# Patient Record
Sex: FEMALE | Race: BLACK OR AFRICAN AMERICAN | NOT HISPANIC OR LATINO | URBAN - METROPOLITAN AREA
[De-identification: names, ages, dates, MRNs, and addresses within clinical notes are randomized per-mention and may not be internally consistent; named-entity substitution may affect disease eponyms.]

---

## 2022-12-24 ENCOUNTER — EMERGENCY (EMERGENCY)
Facility: HOSPITAL | Age: 62
LOS: 0 days | Discharge: HOME | End: 2022-12-24
Attending: STUDENT IN AN ORGANIZED HEALTH CARE EDUCATION/TRAINING PROGRAM
Payer: MEDICAID

## 2022-12-24 VITALS
SYSTOLIC BLOOD PRESSURE: 143 MMHG | DIASTOLIC BLOOD PRESSURE: 83 MMHG | OXYGEN SATURATION: 99 % | RESPIRATION RATE: 16 BRPM | TEMPERATURE: 98 F | HEART RATE: 62 BPM

## 2022-12-24 VITALS
HEIGHT: 63 IN | HEART RATE: 67 BPM | SYSTOLIC BLOOD PRESSURE: 170 MMHG | TEMPERATURE: 98 F | DIASTOLIC BLOOD PRESSURE: 78 MMHG | WEIGHT: 179.9 LBS | RESPIRATION RATE: 17 BRPM | OXYGEN SATURATION: 100 %

## 2022-12-24 DIAGNOSIS — S00.93XA CONTUSION OF UNSPECIFIED PART OF HEAD, INITIAL ENCOUNTER: ICD-10-CM

## 2022-12-24 DIAGNOSIS — Z79.84 LONG TERM (CURRENT) USE OF ORAL HYPOGLYCEMIC DRUGS: ICD-10-CM

## 2022-12-24 DIAGNOSIS — Z79.82 LONG TERM (CURRENT) USE OF ASPIRIN: ICD-10-CM

## 2022-12-24 DIAGNOSIS — M54.2 CERVICALGIA: ICD-10-CM

## 2022-12-24 DIAGNOSIS — S16.1XXA STRAIN OF MUSCLE, FASCIA AND TENDON AT NECK LEVEL, INITIAL ENCOUNTER: ICD-10-CM

## 2022-12-24 DIAGNOSIS — R51.9 HEADACHE, UNSPECIFIED: ICD-10-CM
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
## 2022-12-24 DIAGNOSIS — I10 ESSENTIAL (PRIMARY) HYPERTENSION: ICD-10-CM
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
## 2022-12-24 DIAGNOSIS — E11.9 TYPE 2 DIABETES MELLITUS WITHOUT COMPLICATIONS: ICD-10-CM

## 2022-12-24 PROCEDURE — 70486 CT MAXILLOFACIAL W/O DYE: CPT | Mod: 26,MA

## 2022-12-24 PROCEDURE — 99285 EMERGENCY DEPT VISIT HI MDM: CPT

## 2022-12-24 PROCEDURE — 70450 CT HEAD/BRAIN W/O DYE: CPT | Mod: 26,MA

## 2022-12-24 PROCEDURE — 72125 CT NECK SPINE W/O DYE: CPT | Mod: 26,MA

## 2022-12-24 RX ORDER — ACETAMINOPHEN 500 MG
975 TABLET ORAL ONCE
Refills: 0 | Status: COMPLETED | OUTPATIENT
Start: 2022-12-24 | End: 2022-12-24

## 2022-12-24 RX ADMIN — Medication 975 MILLIGRAM(S): at 22:25

## 2022-12-24 NOTE — ED ADULT NURSE NOTE - NEURO MENTATION
Ms. Newell is a 62 y/o F with PMHx of chronic back pain, anemia, and DM/HTN nephropathy ESRD now s/p living kidney transplant 1/14/2019 (Campath induction, CMV D+/R). Her post op course has been significant for multiple readmissions 2/2 fatigue, weakness, BOYER, and recurrent ESBL Klebsiella UTIs (most recently treated with 5 days of ertapenem to end 4/16 then started on Cipro x 9 days ending 4/25).  Pt admitted as a transfer from [facility] where she presented early AM 5/6 due to n/v this AM and fevers x several days. Tmax in the .5. Infectious workup initiated in ED - Blood cx NGTD. UA negative for infection. Will repeat UA and cx as pt has hx recurrent UTIs. ID consulted (pt had appt w/ Dr Kessler scheduled for 5/7 due to fevers over the weekend). [facility] labs significant for anemia (H/H 6.9/21.0). Pt has hx chronic anemia of indeterminate etiology. Will repeat CBC and check iron studies, folate, B12, LDH, retic, haptoglobin, and Parvovirus PCR. Graft function stable with Cr 1.3 this AM and good UOP.    normal

## 2022-12-24 NOTE — ED PROVIDER NOTE - NS ED ROS FT
Constitutional: (-) Fever, (-) Chills  Skin: (-) Rashes  Eyes: (-) Visual changes, (-) Discharge, (-) Redness  Ears: (-) Hearing loss, (-)Tinnitus, (-) Ear pain  Nose: (-) Nasal congestion, (-) Runny nose  Mouth/Throat: (-) Sore throat  CV: (-) Chest pain  Resp: (-) Cough, (-) Shortness of breath, (-) Dyspnea on Exertion, (-) Wheezing  GI: (-) Abdominal pain, (-) Nausea, (-) Vomiting, (-) Diarrhea  : (-) Dysuria   MSK: (+) Neck pain, (+) Head pain, (-) Myalgias  Neuro: (-) Headache

## 2022-12-24 NOTE — ED PROVIDER NOTE - ENMT, MLM
Head atraumatic, symmetric. No step off, raccoon eyes, hannon sign, CSF leakage. Airway patent, Nasal mucosa clear. Mouth with normal mucosa. Throat has no vesicles, no oropharyngeal exudates and uvula is midline.

## 2022-12-24 NOTE — ED PROVIDER NOTE - PATIENT PORTAL LINK FT
You can access the FollowMyHealth Patient Portal offered by [facility] by registering at the following website: [email or web address]. By joining [employer]’s FollowMyHealth portal, you will also be able to view your health information using other applications (apps) compatible with our system.
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
## 2022-12-24 NOTE — ED PROVIDER NOTE - CLINICAL SUMMARY MEDICAL DECISION MAKING FREE TEXT BOX
62-year-old female with past medical history of metformin, hypertension, on baby aspirin daily, no blood thinners presents emergency room for head trauma.  Reporting head, facial and posterior neck pain after gait came down on it.  Denies acute change in vision, numbness, tingling, loss of consciousness, abrasion or laceration. Vital signs stable, exam unremarkable, low suspicion for fracture or intracranial pathology, will give meds, CT, reassess. Dispo pending results.
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
## 2022-12-24 NOTE — ED ADULT NURSE NOTE - NSIMPLEMENTINTERV_GEN_ALL_ED
Implemented All Universal Safety Interventions:  [place] to call system. Call bell, personal items and telephone within reach. Instruct patient to call for assistance. Room bathroom lighting operational. Non-slip footwear when patient is off stretcher. Physically safe environment: no spills, clutter or unnecessary equipment. Stretcher in lowest position, wheels locked, appropriate side rails in place.

## 2023-01-05 ENCOUNTER — APPOINTMENT (OUTPATIENT)
Dept: INTERNAL MEDICINE | Facility: CLINIC | Age: 63
End: 2023-01-05

## 2023-01-10 ENCOUNTER — EMERGENCY (EMERGENCY)
Facility: HOSPITAL | Age: 63
LOS: 1 days | Discharge: HOME | End: 2023-01-10
Attending: EMERGENCY MEDICINE | Admitting: EMERGENCY MEDICINE
Payer: SELF-PAY

## 2023-01-10 VITALS
OXYGEN SATURATION: 100 % | RESPIRATION RATE: 16 BRPM | TEMPERATURE: 99 F | HEART RATE: 62 BPM | SYSTOLIC BLOOD PRESSURE: 149 MMHG | DIASTOLIC BLOOD PRESSURE: 69 MMHG

## 2023-01-10 LAB
ALBUMIN SERPL ELPH-MCNC: 4.3 G/DL — SIGNIFICANT CHANGE UP (ref 3.3–5)
ALP SERPL-CCNC: 75 U/L — SIGNIFICANT CHANGE UP (ref 40–120)
ALT FLD-CCNC: 19 U/L — SIGNIFICANT CHANGE UP (ref 4–33)
ANION GAP SERPL CALC-SCNC: 9 MMOL/L — SIGNIFICANT CHANGE UP (ref 7–14)
AST SERPL-CCNC: 20 U/L — SIGNIFICANT CHANGE UP (ref 4–32)
BASOPHILS # BLD AUTO: 0.02 K/UL — SIGNIFICANT CHANGE UP (ref 0–0.2)
BASOPHILS NFR BLD AUTO: 0.2 % — SIGNIFICANT CHANGE UP (ref 0–2)
BILIRUB SERPL-MCNC: 0.3 MG/DL — SIGNIFICANT CHANGE UP (ref 0.2–1.2)
BUN SERPL-MCNC: 8 MG/DL — SIGNIFICANT CHANGE UP (ref 7–23)
CALCIUM SERPL-MCNC: 9.8 MG/DL — SIGNIFICANT CHANGE UP (ref 8.4–10.5)
CHLORIDE SERPL-SCNC: 103 MMOL/L — SIGNIFICANT CHANGE UP (ref 98–107)
CO2 SERPL-SCNC: 29 MMOL/L — SIGNIFICANT CHANGE UP (ref 22–31)
CREAT SERPL-MCNC: 0.67 MG/DL — SIGNIFICANT CHANGE UP (ref 0.5–1.3)
EGFR: 99 ML/MIN/1.73M2 — SIGNIFICANT CHANGE UP
EOSINOPHIL # BLD AUTO: 0.05 K/UL — SIGNIFICANT CHANGE UP (ref 0–0.5)
EOSINOPHIL NFR BLD AUTO: 0.6 % — SIGNIFICANT CHANGE UP (ref 0–6)
GLUCOSE SERPL-MCNC: 117 MG/DL — HIGH (ref 70–99)
HCT VFR BLD CALC: 37.8 % — SIGNIFICANT CHANGE UP (ref 34.5–45)
HGB BLD-MCNC: 12.4 G/DL — SIGNIFICANT CHANGE UP (ref 11.5–15.5)
IANC: 5.21 K/UL — SIGNIFICANT CHANGE UP (ref 1.8–7.4)
IMM GRANULOCYTES NFR BLD AUTO: 0.6 % — SIGNIFICANT CHANGE UP (ref 0–0.9)
LYMPHOCYTES # BLD AUTO: 2.92 K/UL — SIGNIFICANT CHANGE UP (ref 1–3.3)
LYMPHOCYTES # BLD AUTO: 33.1 % — SIGNIFICANT CHANGE UP (ref 13–44)
MCHC RBC-ENTMCNC: 29 PG — SIGNIFICANT CHANGE UP (ref 27–34)
MCHC RBC-ENTMCNC: 32.8 GM/DL — SIGNIFICANT CHANGE UP (ref 32–36)
MCV RBC AUTO: 88.5 FL — SIGNIFICANT CHANGE UP (ref 80–100)
MONOCYTES # BLD AUTO: 0.58 K/UL — SIGNIFICANT CHANGE UP (ref 0–0.9)
MONOCYTES NFR BLD AUTO: 6.6 % — SIGNIFICANT CHANGE UP (ref 2–14)
NEUTROPHILS # BLD AUTO: 5.21 K/UL — SIGNIFICANT CHANGE UP (ref 1.8–7.4)
NEUTROPHILS NFR BLD AUTO: 58.9 % — SIGNIFICANT CHANGE UP (ref 43–77)
NRBC # BLD: 0 /100 WBCS — SIGNIFICANT CHANGE UP (ref 0–0)
NRBC # FLD: 0 K/UL — SIGNIFICANT CHANGE UP (ref 0–0)
PLATELET # BLD AUTO: 268 K/UL — SIGNIFICANT CHANGE UP (ref 150–400)
POTASSIUM SERPL-MCNC: 3.9 MMOL/L — SIGNIFICANT CHANGE UP (ref 3.5–5.3)
POTASSIUM SERPL-SCNC: 3.9 MMOL/L — SIGNIFICANT CHANGE UP (ref 3.5–5.3)
PROT SERPL-MCNC: 8 G/DL — SIGNIFICANT CHANGE UP (ref 6–8.3)
RAPID RVP RESULT: SIGNIFICANT CHANGE UP
RBC # BLD: 4.27 M/UL — SIGNIFICANT CHANGE UP (ref 3.8–5.2)
RBC # FLD: 11.8 % — SIGNIFICANT CHANGE UP (ref 10.3–14.5)
SARS-COV-2 RNA SPEC QL NAA+PROBE: SIGNIFICANT CHANGE UP
SODIUM SERPL-SCNC: 141 MMOL/L — SIGNIFICANT CHANGE UP (ref 135–145)
WBC # BLD: 8.83 K/UL — SIGNIFICANT CHANGE UP (ref 3.8–10.5)
WBC # FLD AUTO: 8.83 K/UL — SIGNIFICANT CHANGE UP (ref 3.8–10.5)

## 2023-01-10 PROCEDURE — 70496 CT ANGIOGRAPHY HEAD: CPT | Mod: 26,MA

## 2023-01-10 PROCEDURE — 70498 CT ANGIOGRAPHY NECK: CPT | Mod: 26,MA

## 2023-01-10 PROCEDURE — 99223 1ST HOSP IP/OBS HIGH 75: CPT

## 2023-01-10 RX ORDER — DEXTROSE 50 % IN WATER 50 %
12.5 SYRINGE (ML) INTRAVENOUS ONCE
Refills: 0 | Status: DISCONTINUED | OUTPATIENT
Start: 2023-01-10 | End: 2023-01-14

## 2023-01-10 RX ORDER — SODIUM CHLORIDE 9 MG/ML
1000 INJECTION, SOLUTION INTRAVENOUS
Refills: 0 | Status: DISCONTINUED | OUTPATIENT
Start: 2023-01-10 | End: 2023-01-14

## 2023-01-10 RX ORDER — ACETAMINOPHEN 500 MG
650 TABLET ORAL ONCE
Refills: 0 | Status: COMPLETED | OUTPATIENT
Start: 2023-01-10 | End: 2023-01-10

## 2023-01-10 RX ORDER — DEXTROSE 50 % IN WATER 50 %
15 SYRINGE (ML) INTRAVENOUS ONCE
Refills: 0 | Status: DISCONTINUED | OUTPATIENT
Start: 2023-01-10 | End: 2023-01-14

## 2023-01-10 RX ORDER — DEXTROSE 50 % IN WATER 50 %
25 SYRINGE (ML) INTRAVENOUS ONCE
Refills: 0 | Status: DISCONTINUED | OUTPATIENT
Start: 2023-01-10 | End: 2023-01-14

## 2023-01-10 RX ORDER — GLUCAGON INJECTION, SOLUTION 0.5 MG/.1ML
1 INJECTION, SOLUTION SUBCUTANEOUS ONCE
Refills: 0 | Status: DISCONTINUED | OUTPATIENT
Start: 2023-01-10 | End: 2023-01-14

## 2023-01-10 RX ORDER — INSULIN LISPRO 100/ML
VIAL (ML) SUBCUTANEOUS
Refills: 0 | Status: DISCONTINUED | OUTPATIENT
Start: 2023-01-10 | End: 2023-01-14

## 2023-01-10 RX ADMIN — Medication 650 MILLIGRAM(S): at 18:48

## 2023-01-10 NOTE — CONSULT NOTE ADULT - SUBJECTIVE AND OBJECTIVE BOX
HPI:  Patient is a 62yF w/pmhx DM2 presenting with persistent headache, dizziness, neck pain since head injury on 12/24. Pt [place] she hit her head on the metal pull garage door which had pulled slightly down. Pt reports dizziness and difficulty walking at the time. Pt states she was taken to an OSH, had head CT performed which resulted normal. Pt saw her PMD today and was sent to the ED for evaluation. Pt reports intermittent dizziness, frontal headache, bilateral neck pain, popping sensation in right ear. Further collateral per daughter over the phone who states that patient failed a "neurological exam" with her PMD this morning. Pt denies vision changes, vomiting, nausea, numbness, weakness, cp, sob, abd pain, n/v/d, blood thinner use, LOC or any other concerns. Daughter also notes patient has had difficult with short term memory and performing simple tasks increasingly since the head trauma. At baseline daughter states that patient is a high functioning person.     ROS: A 10-system ROS was performed and is negative except for those items noted above and/or in the HPI.    PAST MEDICAL & SURGICAL HISTORY:  DM2 (diabetes mellitus, type 2)      No significant past surgical history        FAMILY HISTORY:      SOCIAL HISTORY: SOCIAL HISTORY:     Marital Status: (  )   (  ) Single  (  )   (  )      Occupation:      Lives: (  ) alone  (  ) with children   (  ) with spouse  (  ) with parents  (  ) other     Illicit Drug Use: (  ) never used  (  ) other _____     Tobacco Use:  (  ) never smoked  (  ) former smoker  (  ) current smoker  (  ) pack year  (  ) last cigarette date     Alcohol Use:      Sexual History:        MEDICATIONS  Home Medications:      MEDICATIONS  (STANDING):  acetaminophen     Tablet .. 650 milliGRAM(s) Oral once    MEDICATIONS  (PRN):      ALLERGIES/INTOLERANCES:  Allergies  No Known Allergies    Intolerances      OBJECTIVE:  VITALS   Vital Signs Last 24 Hrs  T(C): 37.2 (10 Aries 2023 13:31), Max: 37.2 (10 Aries 2023 13:31)  T(F): 98.9 (10 Aries 2023 13:31), Max: 98.9 (10 Aries 2023 13:31)  HR: 62 (10 Raies 2023 13:31) (62 - 62)  BP: 149/69 (10 Aries 2023 13:31) (149/69 - 149/69)  BP(mean): --  RR: 16 (10 Aries 2023 13:31) (16 - 16)  SpO2: 100% (10 Aries 2023 13:31) (100% - 100%)    Parameters below as of 10 Aries 2023 13:31  Patient On (Oxygen Delivery Method): room air        PHYSICAL EXAM:  General: Well developed, in NAD   Head: atraumatic   Respiratory: non-laboured breathing, regular rate  GI: Nondistended   Integumentary: Warm and Dry   Psychiatric: Normal Affect     Neurological Exam:  Mental Status: Orientated to self, and place. Not oriented to time.  Occasional inattentiveness noted.  No dysarthria, or neglect.  Comprehension intact to naming. Knowledge intact.  Registration intact.  Able to follow some simple 1 step commands, difficulty with others. Difficulty with right/left differentiation.   Cranial Nerves: PERRL, EOMI, +BTT b/l - unable to follow commands for formal peripheral field testing, no nystagmus.  CN V1-3 intact to light touch.  No facial asymmetry.  Hearing intact.  Tongue midline.  Sternocleidomastoid and Trapezius intact bilaterally.  Motor:   Tone: normal            Strength: Symmetric, effort limited exam. Antigravity throughout.   Pronator drift: none                 Dysmetria: None to finger-nose-finger   No truncal ataxia.    Tremor: No resting, postural or action tremor.  No myoclonus.  Sensation: intact to light touch, pinprick, vibration and proprioception  Deep Tendon Reflexes: 1+ bilateral biceps, triceps, brachioradialis, knee and ankle  Toes mute bilaterally  ?Rhomberg  Gait: normal and stable.      LABORATORY:  CBC                       12.4   8.83  )-----------( 268      ( 10 Aries 2023 15:54 )             37.8     Chem 01-10    141  |  103  |  8   ----------------------------<  117<H>  3.9   |  29  |  0.67    Ca    9.8      10 Aries 2023 15:54    TPro  8.0  /  Alb  4.3  /  TBili  0.3  /  DBili  x   /  AST  20  /  ALT  19  /  AlkPhos  75  01-10    LFTs LIVER FUNCTIONS - ( 10 Aries 2023 15:54 )  Alb: 4.3 g/dL / Pro: 8.0 g/dL / ALK PHOS: 75 U/L / ALT: 19 U/L / AST: 20 U/L / GGT: x           Coagulopathy   Lipid Panel   A1c   Cardiac enzymes     U/A   CSF  Immunological  Other    STUDIES & IMAGING:  Studies (EKG, EEG, EMG, etc):       Radiology (XR, CT, MR, U/S, TTE/ADRI):

## 2023-01-10 NOTE — CONSULT NOTE ADULT - ASSESSMENT
Patient is a 62yF w/pmhx DM2 presenting with persistent headache, dizziness, neck pain since head injury on 12/24. Pt [place] she hit her head on the metal pull garage door which had pulled slightly down. Pt reports dizziness and difficulty walking at the time. Pt states she was taken to an OSH, had head CT performed which resulted normal. Pt saw her PMD today and was sent to the ED for evaluation. Pt reports intermittent dizziness, frontal headache, bilateral neck pain, popping sensation in right ear. Further collateral per daughter over the phone who states that patient failed a "neurological exam" with her PMD this morning. Pt denies vision changes, vomiting, nausea, numbness, weakness, cp, sob, abd pain, n/v/d, blood thinner use, LOC or any other concerns. Daughter also notes patient has had difficult with short term memory and performing simple tasks increasingly since the head trauma. At baseline daughter states that patient is a high functioning person. Neuro exam with some psychomotor delay and difficulty following commands as well as poor memory/recall. No gross motor or sensory deficits noted.     Impression   Likely post-concussive syndrome. Would r/o structural etiology    Recommendations  - CTH  - CTA H/N or MRA H/N   - MR brain without contrast - consider CDU admission   - headache management with tylenol as needed   - Referral for concussion clinic after discharge   - follow up with neurology after discharge   - PT/OT  - Fall precautions       Case to be discussed with neurology attending Dr. Butler Patient is a 62yF w/pmhx DM2 presenting with persistent headache, dizziness, neck pain since head injury on 12/24. Pt was [place] when she hit her head on the metal pull garage door which had pulled slightly down. Pt reports dizziness and difficulty walking at the time. Pt states she was taken to an OSH, had head CT performed which resulted normal. Pt saw her PMD today and was sent to the ED for evaluation. Pt reports intermittent dizziness, frontal headache, bilateral neck pain, popping sensation in right ear. Further collateral per daughter over the phone who states that patient failed a "neurological exam" with her PMD this morning. Pt denies vision changes, vomiting, nausea, numbness, weakness, cp, sob, abd pain, n/v/d, blood thinner use, LOC or any other concerns. Daughter also notes patient has had difficult with short term memory and performing simple tasks increasingly since the head trauma. At baseline daughter states that patient is a high functioning person. Neuro exam with some psychomotor delay and difficulty following commands as well as poor memory/recall. No gross motor or sensory deficits noted.     Impression   Likely post-concussive syndrome. Would r/o structural etiology    Recommendations  - CTH  - CTA H/N or MRA H/N   - MR brain without contrast - consider CDU admission   - headache management with tylenol as needed   - Referral for concussion clinic after discharge   - follow up with neurology after discharge   - PT/OT  - Fall precautions       Case to be discussed with neurology attending Dr. Toribio

## 2023-01-10 NOTE — ED CDU PROVIDER INITIAL DAY NOTE - OBJECTIVE STATEMENT
Patient is a 62 y.o Female with PMHx of DM (on metformin) who presents to ED for persistent HA and dizziness s/p head injury 12/24. As per patient states that she suffered head injury while walking [place] of an [place], she hit her head on metal pull garage door. States she immediately felt off balance after she hit her head and states she was brought to an ER and had imaging done.  Pt saw PMD today and given persistent symptoms with supposedly "abnormal neuro exam" she was advised to go to ED. In ED; Labs wnl. CTA H/N wnl. Neurology consulted on patient. Pt transferred to CDU for; neuro checks, MR head, vitals q4 and pain control for HA.

## 2023-01-10 NOTE — ED PROVIDER NOTE - OBJECTIVE STATEMENT
62yF w/pmhx DM2 presenting with persistent headache, dizziness, neck pain since head injury on 12/24. Pt was [place] she hit her head on the metal pull garage door which had pulled slightly down. Pt reports dizziness and difficulty walking at the time. Pt states she was taken to an OSH, had head CT performed which resulted normal. Pt saw her PMD today and was sent to the ED for evaluation. Pt reports intermittent dizziness, frontal headache, bilateral neck pain, popping sensation in right ear. Pts daughter states she failed a "neurological exam" with her PMD this morning. Pt denies vision changes, vomiting, nausea, numbness, weakness, cp, sob, abd pain, n/v/d, blood thinner use, LOC or any other concerns.  Of note daughter states pt is having trouble with short term memory

## 2023-01-10 NOTE — ED ADULT NURSE NOTE - OBJECTIVE STATEMENT
Patient A&o X4, received in intake, with complaints of dizziness. Patient states, "I hit my head on a shutter on [address] and since then I've been in pain". Patient complains of headache, neck and upper back pain rating 9 out of 10 currently. Patient admits difficulty ambulating due to dizziness. Patient able to speak in clear sentences, respirations equal and unlabored. Lung sounds clear b/l, equal chest rise and fall noted. Denies CP/SOB, fever, chills, nausea, vomiting and diarrhea at this time. Skin warm and dry. Provider at bedside for eval, pending further orders.
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
## 2023-01-10 NOTE — ED ADULT TRIAGE NOTE - HAVE YOU HAD COVID IN THE LAST 60 DAYS?
Called pharmacy and informed pharmacist that MD denied the refill request. Pharmacist verbalized understanding with no further questions. No

## 2023-01-10 NOTE — ED ADULT NURSE NOTE - NSIMPLEMENTINTERV_GEN_ALL_ED
Implemented All Fall Risk Interventions:  [place] to call system. Call bell, personal items and telephone within reach. Instruct patient to call for assistance. Room bathroom lighting operational. Non-slip footwear when patient is off stretcher. Physically safe environment: no spills, clutter or unnecessary equipment. Stretcher in lowest position, wheels locked, appropriate side rails in place. Provide visual cue, wrist band, yellow gown, etc. Monitor gait and stability. Monitor for mental status changes and reorient to person, place, and time. Review medications for side effects contributing to fall risk. Reinforce activity limits and safety measures with patient and family.

## 2023-01-10 NOTE — ED PROVIDER NOTE - PHYSICAL EXAMINATION
Neuro: A&Ox2 (not oriented to time), PERRL, symmetric facial expressions, pt has difficulty with finger to nose and rapid alternating movements, no gait abnormality Neuro: A&Ox2 (not oriented to time, change from her baseline), PERRL, symmetric facial expressions, pt has difficulty with finger to nose and rapid alternating movements, no gait abnormality

## 2023-01-10 NOTE — ED PROVIDER NOTE - CLINICAL SUMMARY MEDICAL DECISION MAKING FREE TEXT BOX
62yF w/pmhx DM2 presenting with persistent headache, dizziness, neck pain since head injury on 12/24. Pt reports intermittent dizziness, frontal headache, bilateral neck pain, popping sensation in right ear. Pts daughter states she failed a "neurological exam" with her PMD this morning. Of note daughter states pt is having trouble with short term memory. On exam

## 2023-01-10 NOTE — ED CDU PROVIDER INITIAL DAY NOTE - CLINICAL SUMMARY MEDICAL DECISION MAKING FREE TEXT BOX
Patient is a 62 y.o Female with PMHx of DM (on metformin) who presents to ED for persistent HA and dizziness s/p head injury 12/24. Pt saw PMD today and was advised to go to ED. In ED; Labs wnl. CTA H/N wnl. Neurology consulted on patient. Pt transferred to CDU for; neuro checks, MR head, vitals q4 and pain control for HA.

## 2023-01-10 NOTE — ED PROVIDER NOTE - PROGRESS NOTE DETAILS
YOEL Gonzalez: Pt seen by neuro recommending CDU for MRI brain, CTa head/neck without acute findings, spoke with CDU PA who accepts pt. Pt aware of plan.

## 2023-01-10 NOTE — ED CDU PROVIDER INITIAL DAY NOTE - PHYSICAL EXAMINATION
Vital signs reviewed.   CONSTITUTIONAL: in no apparent distress. Non-toxic appearing.   HEAD: Normocephalic, atraumatic.  EYES: PERRL, EOM intact, conjunctiva and sclera WNL. Unable to assess visual fields due to patient not following commands.   ENT: normal nose; no rhinorrhea;   NECK/LYMPH: Supple; non-tender  CARD: Normal S1, S2; no murmurs  RESP: Normal chest excursion with respiration; breath sounds clear and equal bilaterally; no wheezes, rhonchi, or rales.  EXT/MS: moves all extremities;   SKIN: Normal for age and race; warm; dry; good turgor; no apparent lesions or exudate noted.  NEURO: Awake, alert, oriented x 2, pt not oriented to time. No drift. No facial droop. No slurred speech. No motor or sensory deficits noted. Pt however slow to follow commands.

## 2023-01-10 NOTE — ED PROVIDER NOTE - CROS ED MUSC ALL NEG
[FreeTextEntry1] :   \par \par  6/25/20\par Plan - aquacel to wound, cavilon amd moisture barrier to periwound, moisturize dry areas\par follow up office 2 weeks, had brought compression stockings but compression too tight, re-measured and ordered 20 -30 mm/hg, orders for nurse given
- - -
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
## 2023-01-10 NOTE — CONSULT NOTE ADULT - ATTENDING COMMENTS
Patient is a 62yF w/pmhx DM2 presenting with persistent headache, dizziness, neck pain since head injury on 12/24. Pe non-focal cervical spasm. Imaging reviewed    Impression" concussion/cervical spasm    counseling Baclofen PRN  Can follow up with Neurology, Dr. Canelo Toribio at [phone]

## 2023-01-11 VITALS
TEMPERATURE: 98 F | OXYGEN SATURATION: 100 % | SYSTOLIC BLOOD PRESSURE: 125 MMHG | RESPIRATION RATE: 17 BRPM | DIASTOLIC BLOOD PRESSURE: 60 MMHG | HEART RATE: 59 BPM

## 2023-01-11 LAB
B PERT DNA SPEC QL NAA+PROBE: SIGNIFICANT CHANGE UP
B PERT+PARAPERT DNA PNL SPEC NAA+PROBE: SIGNIFICANT CHANGE UP
BORDETELLA PARAPERTUSSIS (RAPRVP): SIGNIFICANT CHANGE UP
C PNEUM DNA SPEC QL NAA+PROBE: SIGNIFICANT CHANGE UP
FLUAV SUBTYP SPEC NAA+PROBE: SIGNIFICANT CHANGE UP
FLUBV RNA SPEC QL NAA+PROBE: SIGNIFICANT CHANGE UP
HADV DNA SPEC QL NAA+PROBE: SIGNIFICANT CHANGE UP
HCOV 229E RNA SPEC QL NAA+PROBE: SIGNIFICANT CHANGE UP
HCOV HKU1 RNA SPEC QL NAA+PROBE: SIGNIFICANT CHANGE UP
HCOV NL63 RNA SPEC QL NAA+PROBE: SIGNIFICANT CHANGE UP
HCOV OC43 RNA SPEC QL NAA+PROBE: SIGNIFICANT CHANGE UP
HMPV RNA SPEC QL NAA+PROBE: SIGNIFICANT CHANGE UP
HPIV1 RNA SPEC QL NAA+PROBE: SIGNIFICANT CHANGE UP
HPIV2 RNA SPEC QL NAA+PROBE: SIGNIFICANT CHANGE UP
HPIV3 RNA SPEC QL NAA+PROBE: SIGNIFICANT CHANGE UP
HPIV4 RNA SPEC QL NAA+PROBE: SIGNIFICANT CHANGE UP
M PNEUMO DNA SPEC QL NAA+PROBE: SIGNIFICANT CHANGE UP
RSV RNA SPEC QL NAA+PROBE: SIGNIFICANT CHANGE UP
RV+EV RNA SPEC QL NAA+PROBE: SIGNIFICANT CHANGE UP

## 2023-01-11 PROCEDURE — 99239 HOSP IP/OBS DSCHRG MGMT >30: CPT

## 2023-01-11 PROCEDURE — 70551 MRI BRAIN STEM W/O DYE: CPT | Mod: 26,MB

## 2023-01-11 RX ORDER — KETOROLAC TROMETHAMINE 30 MG/ML
15 SYRINGE (ML) INJECTION ONCE
Refills: 0 | Status: DISCONTINUED | OUTPATIENT
Start: 2023-01-11 | End: 2023-01-11

## 2023-01-11 RX ORDER — DIAZEPAM 5 MG
5 TABLET ORAL ONCE
Refills: 0 | Status: DISCONTINUED | OUTPATIENT
Start: 2023-01-11 | End: 2023-01-11

## 2023-01-11 RX ORDER — METOCLOPRAMIDE HCL 10 MG
10 TABLET ORAL ONCE
Refills: 0 | Status: COMPLETED | OUTPATIENT
Start: 2023-01-11 | End: 2023-01-11

## 2023-01-11 RX ORDER — DIAZEPAM 5 MG
1 TABLET ORAL
Qty: 6 | Refills: 0
Start: 2023-01-11 | End: 2023-01-12

## 2023-01-11 RX ORDER — LIDOCAINE 4 G/100G
2 CREAM TOPICAL ONCE
Refills: 0 | Status: COMPLETED | OUTPATIENT
Start: 2023-01-11 | End: 2023-01-11

## 2023-01-11 RX ORDER — ACETAMINOPHEN 500 MG
975 TABLET ORAL ONCE
Refills: 0 | Status: COMPLETED | OUTPATIENT
Start: 2023-01-11 | End: 2023-01-11

## 2023-01-11 RX ADMIN — Medication 5 MILLIGRAM(S): at 11:45

## 2023-01-11 RX ADMIN — Medication 10 MILLIGRAM(S): at 09:22

## 2023-01-11 RX ADMIN — LIDOCAINE 2 PATCH: 4 CREAM TOPICAL at 11:45

## 2023-01-11 RX ADMIN — Medication 975 MILLIGRAM(S): at 09:22

## 2023-01-11 RX ADMIN — Medication 2: at 07:44

## 2023-01-11 RX ADMIN — Medication 15 MILLIGRAM(S): at 11:45
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
## 2023-01-11 NOTE — ED CDU PROVIDER SUBSEQUENT DAY NOTE - PHYSICAL EXAMINATION
Vital signs reviewed.   CONSTITUTIONAL: in no apparent distress. Non-toxic appearing.   HEAD: Normocephalic, atraumatic.  EYES: PERRL, EOM intact, conjunctiva and sclera WNL. Unable to assess visual fields due to patient not following commands.   ENT: normal nose; no rhinorrhea;   NECK/LYMPH: Supple; non-tender  CARD: Normal S1, S2; no murmurs  RESP: Normal chest excursion with respiration; breath sounds clear and equal bilaterally; no wheezes, rhonchi, or rales.  EXT/MS: moves all extremities;   SKIN: Normal for age and race; warm; dry; good turgor; no apparent lesions or exudate noted.  NEURO: Awake, alert, oriented x 2, pt not oriented to time. No drift. No facial droop. No slurred speech. No motor or sensory deficits noted. Pt however slow to follow commands.
Well appearing, well nourished, awake, alert, oriented to person, place, time/situation and in no apparent distress.    Airway patent    Eyes without scleral injection. No jaundice.    Strong pulse.    Respirations unlabored.    Abdomen soft, non-tender, no guarding.    Spine appears normal, range of motion is not limited, +cervical paraspinal muscle tenderness, full neck ROM.     Alert and oriented, no gross motor or sensory deficits.    Skin normal color for race, warm, dry and intact. No evidence of rash.

## 2023-01-11 NOTE — ED CDU PROVIDER SUBSEQUENT DAY NOTE - HISTORY
Patient is a 62 y.o Female with PMHx of DM (on metformin) who presents to ED for persistent HA and dizziness s/p head injury 12/24. Pt saw PMD today and was advised to go to ED. In ED; Labs wnl. CTA H/N wnl. Neurology consulted on patient. Pt transferred to CDU for; neuro checks, MR head, vitals q4 and pain control for HA.    In interim- Pt resting comfortably. No complaints overnight. No acute events. Pt pending MR and neuro re-eval.
62 y.o Female with PMHx of DM (on metformin) who presents to ED for persistent HA and dizziness s/p head injury 12/24. Pt saw PMD today and was advised to go to ED. In ED; Labs wnl. CTA H/N wnl. Neurology consulted on patient. Pt transferred to CDU for; neuro checks, MR head, vitals q4 and pain control for HA. Patient reassessed several times today, complained of neck/shoulder pain, improved after Valium/muscle relaxer.  Patient seen by neurology attending, recommend outpatient follow-up with pain control/antispasmodics.  Patient agrees with plan.  Daughter will be taking patient home.  Patient walking without assistance in the CDU.

## 2023-01-11 NOTE — ED CDU PROVIDER DISPOSITION NOTE - NSFOLLOWUPINSTRUCTIONS_ED_ALL_ED_FT
Follow with your PMD within 48-72 hours. Follow up with Dr. Toribio neurologist, call [phone] to make an appointment. Rest, no heavy lifting.  Warm compresses to area. May use over the counter Lidocaine patches and Tylenol as needed for pain. Valium 1 tablet every 8 hours as needed for muscle spasm, caution no driving when taking, causes drowsiness. Any worsening pain, weakness, numbness, headache, vomiting, return to ER.    What is a concussion?  A concussion is a mild brain injury that can cause confusion, memory loss, and headache. Sometimes people pass out (lose consciousness) when they have a concussion, but not always.    A concussion usually happens after hitting your head. But in some cases, it can happen after an injury or accident that causes violent shaking of the head.    Common causes of mild brain injuries include:    ?Car accidents    ?Falling down and other accidents that can happen from daily activities    ?Injuries from playing sports such as football, ice hockey, soccer, and boxing    ?Injuries that can happen to soldiers during combat. These include injuries from blasts and bullet wounds.    What are the symptoms of a concussion?  Symptoms that can happen minutes to hours after a concussion include:    ?Memory loss – People sometimes forget what caused their injury, as well as what happened right before and after the injury.    ?Confusion    ?Headache    ?Dizziness or trouble with balance    ?Nausea or vomiting    ?Feeling very tired or sleepy    ?Acting cranky, irritable, or not like themselves    Other symptoms that can happen hours to days after a concussion include:    ?Trouble walking or talking    ?Memory problems or problems paying attention    ?Trouble sleeping    ?Mood or behavior changes    ?Being bothered by noise or light    Will I need tests?  It depends on your injury and symptoms. To check if you have a concussion, your doctor will ask about your symptoms and do a physical exam. They will also ask you questions to check that you are thinking clearly.    If your doctor suspects a serious injury, they might order an imaging test of the brain, such as a CT or MRI scan. These tests create pictures of the skull and inside of the brain.    How is a concussion treated?  A concussion does not usually need treatment. Most concussions get better on their own, but it can take time. Some people's symptoms go away within minutes to hours. Other people have symptoms for weeks to months. When symptoms last a long time, doctors call it "postconcussion syndrome."    To help your recovery after a concussion, you can:    ?Rest your body – Make sure to get plenty of sleep. Avoid heavy exercise or too much physical activity if it makes you feel worse.    ?Rest your brain – Avoid doing activities that need concentration or a lot of attention if they make you feel worse. Sometimes activities using screens, especially video games, can make people feel worse after a concussion. You can start doing these things again as you get better.    ?Not drink alcohol or use marijuana while you are still having symptoms of concussion    ?Take a pain-relieving medicine, if you have a headache – You can choose one with acetaminophen (sample brand name: Tylenol) or ibuprofen (sample brand names: Advil, Motrin).    When should I call the doctor or nurse?  If you had a concussion, or think you might have had one, call your doctor or nurse. They can tell you whether you should go to the emergency department, and how quickly you should be seen.    After a concussion, your doctor might recommend that someone stay with you for 12 to 24 hours. This person should watch for any new symptoms. They should call the doctor or nurse right away if they have any trouble waking you up.    Someone should also call the doctor or nurse if:    ?You have trouble staying awake    ?You have a severe headache, or a headache that gets worse    ?You vomit more than once    ?You have a seizure    ?You have trouble walking or talking    ?Your vision changes    ?You feel weak or numb in part of your body    ?You lose control over your bladder or bowels    When can I play sports or do my usual activities again?  Ask your doctor when you can play sports or do your usual activities again. It will depend on your injury and symptoms, as well as the type of sport you play. Do not go back to playing on the same day as your injury.    It's important to let your brain heal completely after a concussion. Getting another concussion before your brain has healed can lead to serious brain problems.    How can I prevent another concussion?  To help prevent another concussion, you can:    ?Wear a helmet when you ride a bike or motorcycle, or play certain sports    ?Wear a seat belt when you drive or ride in a car    If you have 1 concussion, it's very important to try to prevent future concussions. Having many concussions might cause long-term brain damage and affect your thinking.

## 2023-01-11 NOTE — ED CDU PROVIDER SUBSEQUENT DAY NOTE - NSICDXPASTMEDICALHX_GEN_ALL_CORE_FT
PAST MEDICAL HISTORY:  DM2 (diabetes mellitus, type 2)     
PAST MEDICAL HISTORY:  DM2 (diabetes mellitus, type 2)

## 2023-01-11 NOTE — ED CDU PROVIDER DISPOSITION NOTE - PATIENT PORTAL LINK FT
You can access the FollowMyHealth Patient Portal offered by [facility] by registering at the following website: [email or web address]. By joining [facility]’s FollowMyHealth portal, you will also be able to view your health information using other applications (apps) compatible with our system.

## 2023-01-11 NOTE — ED CDU PROVIDER SUBSEQUENT DAY NOTE - ATTENDING APP SHARED VISIT CONTRIBUTION OF CARE
62 year old with dizziness and brain fog after head injury on 12/24.  sent to cdu for mr and neuro consult.

## 2023-01-11 NOTE — ED CDU PROVIDER DISPOSITION NOTE - ATTENDING CONTRIBUTION TO CARE
62 year old with dizziness and brain fog after head injury on 12/24.  sent to cdu for mr and neuro consult. mr with no acute findings. cleared by neuro for outpatient fu

## 2023-01-11 NOTE — ED CDU PROVIDER SUBSEQUENT DAY NOTE - NSICDXNOPASTSURGICALHX_GEN_ALL_ED
<-- Click to add NO significant Past Surgical History
<-- Click to add NO significant Past Surgical History
no

## 2023-01-11 NOTE — ED CDU PROVIDER SUBSEQUENT DAY NOTE - CROS ED ROS STATEMENT
General Medicine Discharge Summary     Patient ID:  Heidi Batres  76year old  10/6/1953    Admit date: 11/19/2021    Discharge date and time:  11/24/21    Attending Physician: Kash Bowen Hospitalist  Pager: [phone]    Consults: IP CONSULT TO HOSPITALIST  IP CONSULT TO INFECTIOUS DISEASE  IP CONSULT TO SOCIAL WORK    Operative Procedures:        Patient instructions:      Current Discharge Medication List    CONTINUE these medications all other ROS negative except as per HPI

## 2024-02-26 NOTE — ED ADULT TRIAGE NOTE - HAVE YOU RECEIVED AT LEAST TWO PFIZER AND/OR MODERNA VACCINATIONS (IN ANY COMBINATION) AND/OR ONE JOHNSON & JOHNSON VACCINATION?
Yes
Quality 226: Preventive Care And Screening: Tobacco Use: Screening And Cessation Intervention: Patient screened for tobacco use and is an ex/non-smoker
Detail Level: Detailed
Quality 47: Advance Care Plan: Advance care planning not documented, reason not otherwise specified.
Quality 431: Preventive Care And Screening: Unhealthy Alcohol Use - Screening: Patient not identified as an unhealthy alcohol user when screened for unhealthy alcohol use using a systematic screening method
Quality 130: Documentation Of Current Medications In The Medical Record: Current Medications Documented

## 2025-06-02 ENCOUNTER — APPOINTMENT (OUTPATIENT)
Dept: ORTHOPEDIC SURGERY | Facility: CLINIC | Age: 65
End: 2025-06-02

## 2025-06-09 ENCOUNTER — APPOINTMENT (OUTPATIENT)
Dept: ORTHOPEDIC SURGERY | Facility: CLINIC | Age: 65
End: 2025-06-09